# Patient Record
Sex: MALE | Race: WHITE | NOT HISPANIC OR LATINO | Employment: OTHER | URBAN - METROPOLITAN AREA
[De-identification: names, ages, dates, MRNs, and addresses within clinical notes are randomized per-mention and may not be internally consistent; named-entity substitution may affect disease eponyms.]

---

## 2018-03-08 ENCOUNTER — TRANSFERRED RECORDS (OUTPATIENT)
Dept: HEALTH INFORMATION MANAGEMENT | Facility: CLINIC | Age: 38
End: 2018-03-08

## 2023-05-22 ENCOUNTER — HOSPITAL ENCOUNTER (EMERGENCY)
Facility: HOSPITAL | Age: 43
Discharge: HOME | End: 2023-05-22
Attending: NURSE PRACTITIONER | Admitting: NURSE PRACTITIONER

## 2023-05-22 VITALS
RESPIRATION RATE: 18 BRPM | SYSTOLIC BLOOD PRESSURE: 142 MMHG | HEART RATE: 90 BPM | OXYGEN SATURATION: 99 % | TEMPERATURE: 98 F | DIASTOLIC BLOOD PRESSURE: 89 MMHG

## 2023-05-22 DIAGNOSIS — G89.29 CHRONIC NECK AND BACK PAIN: Primary | ICD-10-CM

## 2023-05-22 DIAGNOSIS — M54.9 CHRONIC NECK AND BACK PAIN: Primary | ICD-10-CM

## 2023-05-22 DIAGNOSIS — M54.2 CHRONIC NECK AND BACK PAIN: Primary | ICD-10-CM

## 2023-05-22 PROCEDURE — 99213 OFFICE O/P EST LOW 20 MIN: CPT | Performed by: NURSE PRACTITIONER

## 2023-05-22 PROCEDURE — G0463 HOSPITAL OUTPT CLINIC VISIT: HCPCS

## 2023-05-22 RX ORDER — TRAMADOL HYDROCHLORIDE 50 MG/1
50 TABLET ORAL EVERY 6 HOURS PRN
Qty: 6 TABLET | Refills: 0 | Status: SHIPPED | OUTPATIENT
Start: 2023-05-22

## 2023-05-22 ASSESSMENT — ENCOUNTER SYMPTOMS
CHILLS: 0
NAUSEA: 0
NECK STIFFNESS: 0
BACK PAIN: 1
PSYCHIATRIC NEGATIVE: 1
MYALGIAS: 0
FEVER: 0
DIARRHEA: 0
SHORTNESS OF BREATH: 0
NECK PAIN: 1
VOMITING: 0

## 2023-05-22 ASSESSMENT — ACTIVITIES OF DAILY LIVING (ADL): ADLS_ACUITY_SCORE: 35

## 2023-05-22 NOTE — ED TRIAGE NOTES
Chronic neck and back pain. Stopped methadone 40 days ago because he didn't want to take it any more. Went thru 30 days of withdrawal. Tried tramadol with no relief. Comes in today with intolerable pain. CMS intact

## 2023-05-22 NOTE — ED TRIAGE NOTES
Chronic neck and back pain, was scheduled at [facility], Doc on call was not in, SL told pt to be seen here. Pt has MRI with from 2018 with injury from 2007. Work injury. Still has pain from it. Getting pt packet to see a PCP. Sitting pain is 7/10. Therapies tried hot pack, ice pack, ibu and tylenol with no relief.     Jessa Behrman, LPN

## 2023-05-22 NOTE — DISCHARGE INSTRUCTIONS
Call the clinic to to set up an establishment of care appointment    Call [facility] at [phone] for further evaluation    Rest  Ice  Over-the-counter topical anesthetics such as Bengay, IcyHot, Biofreeze or Lidoderm patches as needed.  Alternate Tylenol and ibuprofen as needed for pain  Tramadol for breakthrough pain  Return to the urgent care/ED with any worsening in condition or additional concerns.

## 2023-05-22 NOTE — ED PROVIDER NOTES
History     Chief Complaint   Patient presents with     Back Pain     HPI  Fidel Chauhan is a 42 year old male who presents to urgent care today ambulatory with complaints of chronic back and neck pain dating back from a work injury in 2007.  Denies any recent fall, injury or trauma.  Denies any bowel or bladder dysfunction.  Denies any saddle anesthesia.  Denies any numbness or weakness of bilateral lower extremities not attributed to pain.  Denies any fever, chills, nausea, vomiting, diarrhea, shortness of breath or chest pain.  Has been attempting heat, cold, ibuprofen and Tylenol without relief.  Was seeing a pain specialist and neurosurgeon in [place], no longer sees, does not have established care in the area.  Recently stopped taking methadone, tramadol works the best for pain.  MRI completed on 9/29/2009 reveals central asymmetric of the left disc herniation with effacement of thecal sac.  Cervical spine MRI from 3/8/2018 revealed cervical spine bone and disc degeneration, bulging disc, foraminal narrowing and pressure on C5 nerve root with effacement of thecal sac.  No other concerns.    Allergies:  No Known Allergies    Problem List:    There are no problems to display for this patient.       Past Medical History:    No past medical history on file.    Past Surgical History:    No past surgical history on file.    Family History:    No family history on file.    Social History:  Marital Status:  Single [1]        Medications:    traMADol (ULTRAM) 50 MG tablet      Review of Systems   Constitutional: Negative for chills and fever.   Respiratory: Negative for shortness of breath.    Cardiovascular: Negative for chest pain.   Gastrointestinal: Negative for diarrhea, nausea and vomiting.   Musculoskeletal: Positive for back pain and neck pain. Negative for gait problem, myalgias and neck stiffness.   Skin: Negative.    Psychiatric/Behavioral: Negative.      Physical Exam   BP: 91/55  Pulse: 90  Temp: 98   F (36.7  C)  Resp: 18  SpO2: 99 %    Physical Exam  Vitals and nursing note reviewed.   Constitutional:       General: He is not in acute distress.     Appearance: Normal appearance. He is not ill-appearing or toxic-appearing.   Cardiovascular:      Rate and Rhythm: Normal rate and regular rhythm.      Pulses: Normal pulses.      Heart sounds: Normal heart sounds.   Pulmonary:      Effort: Pulmonary effort is normal.      Breath sounds: Normal breath sounds.   Musculoskeletal:      Right shoulder: Normal.      Left shoulder: Normal.      Right hand: Normal.      Left hand: Normal.      Cervical back: Tenderness present. No swelling, edema, deformity, erythema, signs of trauma, lacerations, rigidity, spasms, torticollis, bony tenderness or crepitus. Decreased range of motion.      Thoracic back: Normal.      Lumbar back: Normal.   Neurological:      Mental Status: He is alert.   Psychiatric:         Mood and Affect: Mood normal.       ED Course     No results found for this or any previous visit (from the past 24 hour(s)).    Medications - No data to display    Assessments & Plan (with Medical Decision Making)     I have reviewed the nursing notes.    I have reviewed the findings, diagnosis, plan and need for follow up with the patient.  (M54.2,  M54.9,  G89.29) Chronic neck and back pain  (primary encounter diagnosis)  Plan:   Patient ambulatory with a nontoxic appearance.  Patient able to stand from a seated position in order to ambulate without any difficulty.  Patient has been experiencing chronic neck and back pain dating back to 2007.  Does not have established PCP and does not currently follow with orthopedics or PT at this time.  Patient states that he was following with a pain specialist and neurosurgeon in [place] and would like to transfer care to [facility] due to moving and does not want to follow-up with those providers any longer.  Denies any recent fall, injury or trauma.  Denies any bowel or  bladder dysfunction.  Denies any saddle anesthesia.  Denies any numbness or weakness bilateral lower extremities no treatment needed pain.  No spinal tenderness upon palpation.  Patient weaned off methadone, states tramadol works well for pain, will prescribe short course of tramadol with expectation that patient calls and establishes care with a PCP and follows up with orthopedics for further evaluation regarding chronic pain.  Patient to rest, alternate ice and heat, over-the-counter topical anesthetics such as Bengay, IcyHot, Biofreeze or Lidoderm patches as needed.  Return to the emergency department with any worsening in condition or additional concerns.  Patient is in agreement with treatment plan.    Discharge Medication List as of 5/22/2023  1:46 PM      START taking these medications    Details   traMADol (ULTRAM) 50 MG tablet Take 1 tablet (50 mg) by mouth every 6 hours as needed for severe pain, Disp-6 tablet, R-0, E-Prescribe           Final diagnoses:   Chronic neck and back pain     5/22/2023   [facility]     Justina Corral NP  05/22/23 [id number]